# Patient Record
Sex: FEMALE | Race: WHITE | HISPANIC OR LATINO | URBAN - METROPOLITAN AREA
[De-identification: names, ages, dates, MRNs, and addresses within clinical notes are randomized per-mention and may not be internally consistent; named-entity substitution may affect disease eponyms.]

---

## 2024-09-06 ENCOUNTER — APPOINTMENT (RX ONLY)
Dept: URBAN - METROPOLITAN AREA CLINIC 66 | Facility: CLINIC | Age: 39
Setting detail: DERMATOLOGY
End: 2024-09-06

## 2024-09-06 DIAGNOSIS — L85.3 XEROSIS CUTIS: ICD-10-CM

## 2024-09-06 DIAGNOSIS — L20.89 OTHER ATOPIC DERMATITIS: ICD-10-CM

## 2024-09-06 DIAGNOSIS — L21.8 OTHER SEBORRHEIC DERMATITIS: ICD-10-CM

## 2024-09-06 DIAGNOSIS — Z71.89 OTHER SPECIFIED COUNSELING: ICD-10-CM

## 2024-09-06 PROBLEM — L30.9 DERMATITIS, UNSPECIFIED: Status: ACTIVE | Noted: 2024-09-06

## 2024-09-06 PROCEDURE — 11104 PUNCH BX SKIN SINGLE LESION: CPT

## 2024-09-06 PROCEDURE — ? TREATMENT REGIMEN

## 2024-09-06 PROCEDURE — ? PRESCRIPTION

## 2024-09-06 PROCEDURE — 99204 OFFICE O/P NEW MOD 45 MIN: CPT | Mod: 25

## 2024-09-06 PROCEDURE — ? COUNSELING

## 2024-09-06 PROCEDURE — ? BIOPSY BY PUNCH METHOD

## 2024-09-06 PROCEDURE — ? ADDITIONAL NOTES

## 2024-09-06 RX ORDER — TRIAMCINOLONE ACETONIDE 1 MG/G
CREAM TOPICAL BID
Qty: 454 | Refills: 6 | Status: ERX | COMMUNITY
Start: 2024-09-06

## 2024-09-06 RX ORDER — CLOBETASOL PROPIONATE 0.5 MG/ML
SOLUTION TOPICAL QD-BID
Qty: 50 | Refills: 6 | Status: ERX | COMMUNITY
Start: 2024-09-06

## 2024-09-06 RX ORDER — PREDNISONE 5 MG/1
TABLET ORAL QD
Qty: 14 | Refills: 0 | Status: ERX | COMMUNITY
Start: 2024-09-06

## 2024-09-06 RX ORDER — KETOCONAZOLE 20 MG/ML
SHAMPOO, SUSPENSION TOPICAL QOD
Qty: 120 | Refills: 11 | Status: ERX | COMMUNITY
Start: 2024-09-06

## 2024-09-06 RX ADMIN — CLOBETASOL PROPIONATE: 0.5 SOLUTION TOPICAL at 00:00

## 2024-09-06 RX ADMIN — TRIAMCINOLONE ACETONIDE: 1 CREAM TOPICAL at 00:00

## 2024-09-06 RX ADMIN — PREDNISONE: 5 TABLET ORAL at 00:00

## 2024-09-06 RX ADMIN — KETOCONAZOLE: 20 SHAMPOO, SUSPENSION TOPICAL at 00:00

## 2024-09-06 ASSESSMENT — LOCATION ZONE DERM
LOCATION ZONE: TRUNK
LOCATION ZONE: NECK
LOCATION ZONE: SCALP
LOCATION ZONE: FEET

## 2024-09-06 ASSESSMENT — LOCATION DETAILED DESCRIPTION DERM
LOCATION DETAILED: LEFT INFERIOR LATERAL NECK
LOCATION DETAILED: RIGHT LATERAL ACHILLES SKIN
LOCATION DETAILED: MID-FRONTAL SCALP
LOCATION DETAILED: LEFT INFERIOR MEDIAL MIDBACK

## 2024-09-06 ASSESSMENT — LOCATION SIMPLE DESCRIPTION DERM
LOCATION SIMPLE: RIGHT FOOT
LOCATION SIMPLE: LEFT ANTERIOR NECK
LOCATION SIMPLE: LEFT LOWER BACK
LOCATION SIMPLE: ANTERIOR SCALP

## 2024-09-06 NOTE — PROCEDURE: TREATMENT REGIMEN
Gas relief -Simethicone   Last Written Prescription Date:  2/14/20  Last Fill Quantity: 120 tabs,   # refills: 2  Last Office Visit: 2/14/20  Future Office visit:       Routing refill request to provider for review/approval because:  Pediatric protocol    
Continue Regimen: Vanicream soaps and moisturizers.
Detail Level: Generalized

## 2024-09-06 NOTE — PROCEDURE: BIOPSY BY PUNCH METHOD

## 2024-09-06 NOTE — PROCEDURE: MIPS QUALITY
Detail Level: Detailed
Quality 130: Documentation Of Current Medications In The Medical Record: Current Medications Documented
Quality 394b: Td/Tdap Immunizations For Adolescents: Patient had one tetanus, diphtheria toxoids and acellular pertussis vaccine (Tdap) on or between the patient's 10th and 13th birthdays.
Quality 226: Preventive Care And Screening: Tobacco Use: Screening And Cessation Intervention: Patient screened for tobacco use and is an ex/non-smoker
Quality 394a: Meningococcal Immunizations For Adolescents: Patient had one dose of meningococcal vaccine (serogroups A, C, W, Y) on or between the patient's 11th and 13th birthdays.
Quality 394c: Hpv Vaccine For Adolescents: Patient has had at least two HPV vaccines (with at least 146 days between the two) OR three HPV vaccines on or between the patient’s 9th and 13th birthdays.

## 2024-09-06 NOTE — PROCEDURE: ADDITIONAL NOTES
Render Risk Assessment In Note?: no
Additional Notes: Recommend waiting to see if treatment works to get biopsy, patient wants to get the biopsy today, discussed that ankle has a higher risk of infection and scarring, patient understands
Detail Level: Simple

## 2024-09-20 ENCOUNTER — APPOINTMENT (RX ONLY)
Dept: URBAN - METROPOLITAN AREA CLINIC 66 | Facility: CLINIC | Age: 39
Setting detail: DERMATOLOGY
End: 2024-09-20

## 2024-09-20 DIAGNOSIS — Z48.02 ENCOUNTER FOR REMOVAL OF SUTURES: ICD-10-CM

## 2024-09-20 PROCEDURE — ? SUTURE REMOVAL

## 2024-09-20 PROCEDURE — 15853 REMOVAL SUTR/STAPL XREQ ANES: CPT

## 2024-09-20 PROCEDURE — ? ADDITIONAL NOTES

## 2024-09-20 ASSESSMENT — LOCATION DETAILED DESCRIPTION DERM: LOCATION DETAILED: RIGHT ACHILLES SKIN

## 2024-09-20 ASSESSMENT — LOCATION SIMPLE DESCRIPTION DERM: LOCATION SIMPLE: RIGHT ACHILLES SKIN

## 2024-09-20 ASSESSMENT — LOCATION ZONE DERM: LOCATION ZONE: LEG

## 2024-09-20 NOTE — PROCEDURE: ADDITIONAL NOTES
Detail Level: Simple
Additional Notes: Discussed consistent with eczema and discussed Dupixent if current treatment is not working
Render Risk Assessment In Note?: no

## 2024-09-20 NOTE — PROCEDURE: SUTURE REMOVAL
Detail Level: Detailed
Add 1585x Cpt? (Do Not Bill If You Billed For The Procedure Placing The Sutures. This Is An Add-On Code That Must Be Billed With An E/M Visit Code): Yes - 86717